# Patient Record
Sex: MALE | URBAN - METROPOLITAN AREA
[De-identification: names, ages, dates, MRNs, and addresses within clinical notes are randomized per-mention and may not be internally consistent; named-entity substitution may affect disease eponyms.]

---

## 2024-08-29 ENCOUNTER — OFFICE VISIT (OUTPATIENT)
Dept: URBAN - METROPOLITAN AREA CLINIC 80 | Facility: CLINIC | Age: 74
End: 2024-08-29
Payer: OTHER GOVERNMENT

## 2024-08-29 ENCOUNTER — DASHBOARD ENCOUNTERS (OUTPATIENT)
Age: 74
End: 2024-08-29

## 2024-08-29 ENCOUNTER — LAB OUTSIDE AN ENCOUNTER (OUTPATIENT)
Dept: URBAN - METROPOLITAN AREA CLINIC 80 | Facility: CLINIC | Age: 74
End: 2024-08-29

## 2024-08-29 ENCOUNTER — OFFICE VISIT (OUTPATIENT)
Dept: URBAN - METROPOLITAN AREA CLINIC 80 | Facility: CLINIC | Age: 74
End: 2024-08-29

## 2024-08-29 VITALS
HEART RATE: 98 BPM | WEIGHT: 175.8 LBS | DIASTOLIC BLOOD PRESSURE: 69 MMHG | BODY MASS INDEX: 23.81 KG/M2 | HEIGHT: 72 IN | SYSTOLIC BLOOD PRESSURE: 123 MMHG | TEMPERATURE: 97.2 F

## 2024-08-29 DIAGNOSIS — K62.5 RECTAL BLEEDING: ICD-10-CM

## 2024-08-29 DIAGNOSIS — R10.31 RLQ ABDOMINAL PAIN: ICD-10-CM

## 2024-08-29 PROCEDURE — 99203 OFFICE O/P NEW LOW 30 MIN: CPT | Performed by: PHYSICIAN ASSISTANT

## 2024-08-29 RX ORDER — ALBUTEROL SULFATE 2.5 MG/3ML
AS NEEDED SOLUTION RESPIRATORY (INHALATION)
Status: ACTIVE | COMMUNITY
Start: 2024-08-29

## 2024-08-29 RX ORDER — OMEPRAZOLE 20 MG/1
AS DIRECTED CAPSULE, DELAYED RELEASE ORAL
Status: ACTIVE | COMMUNITY
Start: 2024-08-29

## 2024-08-29 RX ORDER — ATORVASTATIN CALCIUM 80 MG/1
AS DIRECTED TABLET, FILM COATED ORAL
Status: ACTIVE | COMMUNITY
Start: 2024-08-29

## 2024-08-29 RX ORDER — MAGNESIUM OXIDE 253 MG/1
AS DIRECTED TABLET ORAL
Status: ACTIVE | COMMUNITY
Start: 2024-08-29

## 2024-08-29 RX ORDER — DULOXETINE 60 MG/1
AS DIRECTED CAPSULE, DELAYED RELEASE PELLETS ORAL
Status: ACTIVE | COMMUNITY
Start: 2024-08-29

## 2024-08-29 RX ORDER — LAMOTRIGINE 200 MG/1
AS DIRECTED TABLET ORAL
Status: ACTIVE | COMMUNITY
Start: 2024-08-29

## 2024-08-29 RX ORDER — TAMSULOSIN HYDROCHLORIDE 0.4 MG/1
AS DIRECTED CAPSULE ORAL
Status: ACTIVE | COMMUNITY
Start: 2024-08-29

## 2024-08-29 RX ORDER — TOPIRAMATE 50 MG/1
AS DIRECTED TABLET, FILM COATED ORAL
Status: ACTIVE | COMMUNITY
Start: 2024-08-29

## 2024-08-29 RX ORDER — METHOCARBAMOL 500 MG/1
AS DIRECTED TABLET ORAL
Status: ACTIVE | COMMUNITY
Start: 2024-08-29

## 2024-08-29 RX ORDER — CETIRIZINE HYDROCHLORIDE 10 MG/1
AS DIRECTED TABLET, FILM COATED ORAL
Status: ACTIVE | COMMUNITY
Start: 2024-08-29

## 2024-08-29 RX ORDER — ALBUTEROL SULFATE 90 UG/1
AS DIRECTED AEROSOL, METERED RESPIRATORY (INHALATION)
Status: ACTIVE | COMMUNITY
Start: 2024-08-29

## 2024-08-29 RX ORDER — FLUTICASONE PROPIONATE 50 UG/1
AS DIRECTED SPRAY, METERED NASAL
Status: ACTIVE | COMMUNITY
Start: 2024-08-29

## 2024-08-29 RX ORDER — AMLODIPINE BESYLATE 10 MG/1
AS DIRECTED TABLET ORAL
Status: ACTIVE | COMMUNITY
Start: 2024-08-29

## 2024-08-29 NOTE — PHYSICAL EXAM CONSTITUTIONAL:
in no acute distress,  well developed, well nourished,  ambulating with cane,  normal communication ability

## 2024-08-29 NOTE — HPI-ZZZTODAY'S VISIT
73-year-old male with past medical history of COPD, aortic aneurysm, stroke, seizures, atrial fibrillation on Xarelto, hypertension who went to the emergency room earlier this month with an episode of bright red blood per rectum.  It was more blood-streaked stool.  He has never had a colonoscopy before.  He had a CT scan on Carmen 3, 2024 that was normal. pt has RLQ abd pain - comes and goes for months now - no change in pain with a BM - nothing seems to make it better or worse no nausea or emesis  normal bowel habit is 1 BM q3-4 days  he had the one episode blood in his stool - has not seen it since no family hx colon ca - brother had colon polyps